# Patient Record
Sex: MALE | Race: WHITE | NOT HISPANIC OR LATINO | Employment: OTHER | URBAN - METROPOLITAN AREA
[De-identification: names, ages, dates, MRNs, and addresses within clinical notes are randomized per-mention and may not be internally consistent; named-entity substitution may affect disease eponyms.]

---

## 2017-05-30 ENCOUNTER — DOCUMENTATION ONLY (OUTPATIENT)
Dept: LAB | Facility: CLINIC | Age: 82
End: 2017-05-30

## 2017-05-30 DIAGNOSIS — C61 MALIGNANT NEOPLASM OF PROSTATE (H): Primary | ICD-10-CM

## 2017-05-30 NOTE — PROGRESS NOTES
Patient has lab only appt 6/8/17. Needs prostate check. Please place appropriate orders.  Thank you,  Kristina higgins

## 2017-05-30 NOTE — LETTER
May 31, 2017      Fernando Hurt  [address] [facility] [address]              Dear Fernando Hurt,    You are due for your regular follow up with Dr. Reeves however he is no longer with [facility]. Please call the scheduling line to arrange an appointment with his partner Dr. Perrin, [phone]. Please schedule a lab appointment one week prior for your PSA test.     Sincerely,       [facility] Urology Team

## 2017-06-08 DIAGNOSIS — C61 MALIGNANT NEOPLASM OF PROSTATE (H): ICD-10-CM

## 2017-06-08 LAB — PSA SERPL-MCNC: 14.6 UG/L (ref 0–4)

## 2017-06-08 PROCEDURE — 84153 ASSAY OF PSA TOTAL: CPT | Performed by: UROLOGY

## 2017-06-08 PROCEDURE — 36415 COLL VENOUS BLD VENIPUNCTURE: CPT | Performed by: UROLOGY

## 2017-06-16 ENCOUNTER — OFFICE VISIT (OUTPATIENT)
Dept: UROLOGY | Facility: CLINIC | Age: 82
End: 2017-06-16
Payer: MEDICARE

## 2017-06-16 VITALS — HEART RATE: 62 BPM | RESPIRATION RATE: 10 BRPM | DIASTOLIC BLOOD PRESSURE: 68 MMHG | SYSTOLIC BLOOD PRESSURE: 102 MMHG

## 2017-06-16 DIAGNOSIS — C61 MALIGNANT NEOPLASM OF PROSTATE (H): Primary | ICD-10-CM

## 2017-06-16 PROCEDURE — 99213 OFFICE O/P EST LOW 20 MIN: CPT | Performed by: UROLOGY

## 2017-06-16 NOTE — LETTER
[address] [address]  [phone]        June 25, 2018    Fernando Hurt  [address] [facility] [address]              Dear Fernando Hurt    This is to remind you that your PSA is due.    You may call our office at [phone] to schedule an appointment.    Please disregard this notice if you have already had your labs drawn or made an appointment.        Sincerely,        Tavera MD/suzanne

## 2017-06-16 NOTE — MR AVS SNAPSHOT
"              After Visit Summary   6/16/2017    Fernando Hurt    MRN: [id number]           Patient Information     Date Of Birth          5/14/1931        Visit Information        Provider Department      6/16/2017 12:00 PM Yeison Perrin MD [facility]        Today's Diagnoses     Malignant Neoplasm of Prostate, clinically T1c    -  1       Follow-ups after your visit        Future tests that were ordered for you today     Open Future Orders        Priority Expected Expires Ordered    PSA tumor marker Routine 12/17/2017 6/17/2018 6/16/2017            Who to contact     If you have questions or need follow up information about today's clinic visit or your schedule please contact [facility] directly at [phone].  Normal or non-critical lab and imaging results will be communicated to you by MyChart, letter or phone within 4 business days after the clinic has received the results. If you do not hear from us within 7 days, please contact the clinic through MyChart or phone. If you have a critical or abnormal lab result, we will notify you by phone as soon as possible.  Submit refill requests through [employer] or call your pharmacy and they will forward the refill request to us. Please allow 3 business days for your refill to be completed.          Additional Information About Your Visit        MyChart Information     [employer] lets you send messages to your doctor, view your test results, renew your prescriptions, schedule appointments and more. To sign up, go to [email or web address] . Click on \"Log in\" on the left side of the screen, which will take you to the Welcome page. Then click on \"Sign up Now\" on the right side of the page.     You will be asked to enter the access code listed below, as well as some personal information. Please follow the directions to create your username and password.     Your access code is: [email or web address]  Expires: 9/14/2017 12:01 PM     Your access " code will  in 90 days. If you need help or a new code, please call your [facility] clinic or [phone].        Care EveryWhere ID     This is your Care EveryWhere ID. This could be used by other organizations to access your [facility] medical records  [id number]        Your Vitals Were     Pulse Respirations                62 10           Blood Pressure from Last 3 Encounters:   17 102/68   09/15/16 128/62   16 130/68    Weight from Last 3 Encounters:   13 77.7 kg (171 lb 3.2 oz)   13 76.7 kg (169 lb)   13 77 kg (169 lb 12.8 oz)               Primary Care Provider Office Phone # Fax #    Rip Temple [phone]       Merged with [facility] [address] [place] [address]        Thank you!     Thank you for choosing [facility]  for your care. Our goal is always to provide you with excellent care. Hearing back from our patients is one way we can continue to improve our services. Please take a few minutes to complete the written survey that you may receive in the mail after your visit with us. Thank you!             Your Updated Medication List - Protect others around you: Learn how to safely use, store and throw away your medicines at www.disposemymeds.org.          This list is accurate as of: 17 12:01 PM.  Always use your most recent med list.                   Brand Name Dispense Instructions for use    aspirin 325 MG tablet      Take  by mouth daily.       LIPITOR 40 MG tablet   Generic drug:  atorvastatin      Take 40 mg by mouth daily.       NATURAL PSYLLIUM SEED PO      Take  by mouth 3 times daily. 1 tsp       NITROSTAT 0.4 MG sublingual tablet   Generic drug:  nitroglycerin      Place  under the tongue every 5 minutes as needed.       PRESERVISION/LUTEIN PO      Take  by mouth.       TOPROL  MG 24 hr tablet   Generic drug:  metoprolol      Take 100 mg by mouth daily.

## 2017-06-16 NOTE — PROGRESS NOTES
Chief Complaint   Patient presents with     PSA Recheck       Fernando Hurt is a 86 year old male who presents today for follow up of   Chief Complaint   Patient presents with     PSA Recheck      Fernando Hurt is an 86-year-old white male who came in for semiannual followup of prostate cancer.  His prostate cancer, Larry score 6, low volume was diagnosed in the year 2007.  He has been managed with active surveillance.  Clinically, he has been entirely asymptomatic.  He has rarely nocturia.  Stream usually is okay, but a little slow in the morning.  Urination is not bothersome.  He is very active, in general, he feels well.  His PSA has slowly risen.  Recent psa is 14's.  Current Outpatient Prescriptions   Medication Sig Dispense Refill     aspirin 325 MG tablet Take  by mouth daily.       atorvastatin (LIPITOR) 40 MG tablet Take 40 mg by mouth daily.       metoprolol (TOPROL XL) 100 MG 24 hr tablet Take 100 mg by mouth daily.       nitroglycerin (NITROSTAT) 0.4 MG SL tablet Place  under the tongue every 5 minutes as needed.       NATURAL PSYLLIUM SEED PO Take  by mouth 3 times daily. 1 tsp       Multiple Vitamins-Minerals (PRESERVISION/LUTEIN PO) Take  by mouth.       No Known Allergies   Past Medical History:   Diagnosis Date     Actinic keratoses      Actinic keratosis      CAD (coronary artery disease) 2013    3 vessel bypass 4/13     Glaucoma      Hyperlipidemia LDL goal < 70      Renal cyst     Rt. large     Renal stones      Skin cancer 2/16/2009    RIGHT SHOULDER,KERATOACANTHOMA     Squamous cell carcinoma (H) 02/10    Rt  forearm     Unstable angina (H) 2013    [facility] consult 4/2013  Dr. Ojeda     Past Surgical History:   Procedure Laterality Date     ARTHROSCOPY KNEE RT/LT      Lt.     C NONSPECIFIC PROCEDURE      finger fracture     CORONARY ARTERY BYPASS  4/19/2013    3 vessel-Dr. Bryan Cannon     HERNIA REPAIR, INGUINAL RT/LT      bilateral, repeat Lt. side     Family History   Problem  Relation Age of Onset     CANCER Mother      Social History     Social History     Marital status:      Spouse name: N/A     Number of children: N/A     Years of education: N/A     Occupational History      Retired     Social History Main Topics     Smoking status: Never Smoker     Smokeless tobacco: Never Used     Alcohol use Yes      Comment: occasional     Drug use: No     Sexual activity: Not Currently     Other Topics Concern     None     Social History Narrative       REVIEW OF SYSTEMS  =================  C: NEGATIVE for fever, chills, change in weight  I: NEGATIVE for worrisome rashes, moles or lesions  E/M: NEGATIVE for ear, mouth and throat problems  R: NEGATIVE for significant cough or SHORTNESS OF BREATH,   CV: NEGATIVE for chest pain, palpitations or peripheral edema  GI: NEGATIVE for nausea, abdominal pain, heartburn, or change in bowel habits  NEURO: NEGATIVE any motor/sensory changes  PSYCH: NEGATIVE for recent mood disorder    Physical Exam:  /68 (BP Location: Right arm, Patient Position: Chair, Cuff Size: Adult Regular)  Pulse 62  Resp 10   Patient is pleasant, in no acute distress, good general condition.  Lung: no evidence of respiratory distress    Abdomen: Soft, nondistended, non tender. No masses. No rebound or guarding.   Exam: no cva tenderness  Skin: Warm and dry.  No redness.  Psych: normal mood and affect  Neuro: alert and oriented    Assessment/Plan:   (C61) Malignant Neoplasm of Prostate, clinically T1c  (primary encounter diagnosis)  Comment: slow progression.  Cont observation.  Plan: PSA tumor marker        In six months

## 2017-06-16 NOTE — NURSING NOTE
"Chief Complaint   Patient presents with     PSA Recheck       Initial /68 (BP Location: Right arm, Patient Position: Chair, Cuff Size: Adult Regular)  Pulse 62  Resp 10 Estimated body mass index is 24.92 kg/(m^2) as calculated from the following:    Height as of 4/9/13: 1.765 m (5' 9.5\").    Weight as of 4/29/13: 77.7 kg (171 lb 3.2 oz).  Medication Reconciliation: complete   Emilie Miramontes RN       "

## 2018-07-26 DIAGNOSIS — C61 MALIGNANT NEOPLASM OF PROSTATE (H): ICD-10-CM

## 2018-07-26 LAB — PSA SERPL-MCNC: 16.7 UG/L (ref 0–4)

## 2018-07-26 PROCEDURE — 36415 COLL VENOUS BLD VENIPUNCTURE: CPT | Performed by: UROLOGY

## 2018-07-26 PROCEDURE — 84153 ASSAY OF PSA TOTAL: CPT | Performed by: UROLOGY

## 2018-08-10 ENCOUNTER — OFFICE VISIT (OUTPATIENT)
Dept: UROLOGY | Facility: CLINIC | Age: 83
End: 2018-08-10
Payer: MEDICARE

## 2018-08-10 VITALS — SYSTOLIC BLOOD PRESSURE: 170 MMHG | HEART RATE: 67 BPM | DIASTOLIC BLOOD PRESSURE: 71 MMHG | OXYGEN SATURATION: 97 %

## 2018-08-10 DIAGNOSIS — R03.0 ELEVATED BLOOD PRESSURE READING WITHOUT DIAGNOSIS OF HYPERTENSION: ICD-10-CM

## 2018-08-10 DIAGNOSIS — C61 MALIGNANT NEOPLASM OF PROSTATE (H): Primary | ICD-10-CM

## 2018-08-10 PROCEDURE — 99213 OFFICE O/P EST LOW 20 MIN: CPT | Performed by: UROLOGY

## 2018-08-10 NOTE — MR AVS SNAPSHOT
After Visit Summary   8/10/2018    Fernando Hurt    MRN: [id number]           Patient Information     Date Of Birth          5/14/1931        Visit Information        Provider Department      8/10/2018 8:45 AM Yeison Perrin MD [facility]        Today's Diagnoses     Malignant Neoplasm of Prostate, clinically T1c    -  1    Elevated blood pressure reading without diagnosis of hypertension           Follow-ups after your visit        Future tests that were ordered for you today     Open Future Orders        Priority Expected Expires Ordered    PSA tumor marker Routine 8/9/2019 8/11/2019 8/10/2018            Who to contact     If you have questions or need follow up information about today's clinic visit or your schedule please contact [facility] directly at [phone].  Normal or non-critical lab and imaging results will be communicated to you by MyChart, letter or phone within 4 business days after the clinic has received the results. If you do not hear from us within 7 days, please contact the clinic through MyChart or phone. If you have a critical or abnormal lab result, we will notify you by phone as soon as possible.  Submit refill requests through [employer] or call your pharmacy and they will forward the refill request to us. Please allow 3 business days for your refill to be completed.          Additional Information About Your Visit        Care EveryWhere ID     This is your Care EveryWhere ID. This could be used by other organizations to access your [facility] medical records  [id number]        Your Vitals Were     Pulse Pulse Oximetry                67 97%           Blood Pressure from Last 3 Encounters:   08/10/18 170/71   06/16/17 102/68   09/15/16 128/62    Weight from Last 3 Encounters:   04/29/13 77.7 kg (171 lb 3.2 oz)   04/09/13 76.7 kg (169 lb)   02/26/13 77 kg (169 lb 12.8 oz)               Primary Care Provider Office Phone # Fax #    Rip ELLIOTT  MD Windy [phone]       [facility] [address] [place] [address]        Equal Access to Services     FELA ANTONY : Hadii aad ku hadmaryni Xiomara, wafabida luqadaha, qaybta kaalmada maria luisa, katharina prajapati humameeta melton laHoangflor zamora. So [facility] [phone].    ATENCIÓN: Si habla español, tiene a choi disposición servicios gratuitos de asistencia lingüística. Llame al [phone].    We comply with applicable federal civil rights laws and [place] laws. We do not discriminate on the basis of race, color, national origin, age, disability, sex, sexual orientation, or gender identity.            Thank you!     Thank you for choosing [facility]  for your care. Our goal is always to provide you with excellent care. Hearing back from our patients is one way we can continue to improve our services. Please take a few minutes to complete the written survey that you may receive in the mail after your visit with us. Thank you!             Your Updated Medication List - Protect others around you: Learn how to safely use, store and throw away your medicines at www.disposemymeds.org.          This list is accurate as of 8/10/18  8:45 AM.  Always use your most recent med list.                   Brand Name Dispense Instructions for use Diagnosis    aspirin 325 MG tablet      Take  by mouth daily.        LIPITOR 40 MG tablet   Generic drug:  atorvastatin      Take 40 mg by mouth daily.        NATURAL PSYLLIUM SEED PO      Take  by mouth 3 times daily. 1 tsp        NITROSTAT 0.4 MG sublingual tablet   Generic drug:  nitroGLYcerin      Place  under the tongue every 5 minutes as needed.        PRESERVISION/LUTEIN PO      Take  by mouth.        TOPROL  MG 24 hr tablet   Generic drug:  metoprolol succinate      Take 100 mg by mouth daily.

## 2018-08-10 NOTE — PROGRESS NOTES
Chief Complaint   Patient presents with     RECHECK     psa       Fernando Hurt is a 87 year old male who presents today for follow up of   Chief Complaint   Patient presents with     RECHECK     psa      Fernando Hurt is an 86-year-old white male who came in for semiannual followup of prostate cancer.  His prostate cancer, Larry score 6, low volume was diagnosed in the year 2007.  He has been managed with active surveillance.  Clinically, he has been entirely asymptomatic.  He has rarely nocturia.  Stream usually is okay, but a little slow in the morning.  Urination is not bothersome.  He is very active, in general, he feels well.  His PSA has slowly risen.  Recent psa is 16's.  Current Outpatient Prescriptions   Medication Sig Dispense Refill     aspirin 325 MG tablet Take  by mouth daily.       atorvastatin (LIPITOR) 40 MG tablet Take 40 mg by mouth daily.       metoprolol (TOPROL XL) 100 MG 24 hr tablet Take 100 mg by mouth daily.       Multiple Vitamins-Minerals (PRESERVISION/LUTEIN PO) Take  by mouth.       NATURAL PSYLLIUM SEED PO Take  by mouth 3 times daily. 1 tsp       nitroglycerin (NITROSTAT) 0.4 MG SL tablet Place  under the tongue every 5 minutes as needed.       No Known Allergies   Past Medical History:   Diagnosis Date     Actinic keratoses      Actinic keratosis      CAD (coronary artery disease) 2013    3 vessel bypass 4/13     Glaucoma      Hyperlipidemia LDL goal < 70      Renal cyst     Rt. large     Renal stones      Skin cancer 2/16/2009    RIGHT SHOULDER,KERATOACANTHOMA     Squamous cell carcinoma 02/10    Rt  forearm     Unstable angina (H) 2013    [facility] consult 4/2013  Dr. Ojeda     Past Surgical History:   Procedure Laterality Date     ARTHROSCOPY KNEE RT/LT      Lt.     C NONSPECIFIC PROCEDURE      finger fracture     CORONARY ARTERY BYPASS  4/19/2013    3 vessel-Dr. Bryan Cannon     HERNIA REPAIR, INGUINAL RT/LT      bilateral, repeat Lt. side     Family History    Problem Relation Age of Onset     Cancer Mother      Social History     Social History     Marital status:      Spouse name: N/A     Number of children: N/A     Years of education: N/A     Occupational History      Retired     Social History Main Topics     Smoking status: Never Smoker     Smokeless tobacco: Never Used     Alcohol use Yes      Comment: occasional     Drug use: No     Sexual activity: Not Currently     Other Topics Concern     None     Social History Narrative       REVIEW OF SYSTEMS  =================  C: NEGATIVE for fever, chills, change in weight  I: NEGATIVE for worrisome rashes, moles or lesions  E/M: NEGATIVE for ear, mouth and throat problems  R: NEGATIVE for significant cough or SHORTNESS OF BREATH,   CV: NEGATIVE for chest pain, palpitations or peripheral edema  GI: NEGATIVE for nausea, abdominal pain, heartburn, or change in bowel habits  NEURO: NEGATIVE any motor/sensory changes  PSYCH: NEGATIVE for recent mood disorder    Physical Exam:  /71 (BP Location: Right arm, Patient Position: Chair, Cuff Size: Adult Regular)  Pulse 67  SpO2 97%   Patient is pleasant, in no acute distress, good general condition.  Lung: no evidence of respiratory distress    Abdomen: Soft, nondistended, non tender. No masses. No rebound or guarding.   Exam: no cva tenderness.  Prostate 40 gm smooth no nodule.  No penile discharge.  No scrotal skin lesion.   Skin: Warm and dry.  No redness.  Psych: normal mood and affect  Neuro: alert and oriented    Assessment/Plan:   (C61) Malignant Neoplasm of Prostate, clinically T1c  (primary encounter diagnosis)  Comment: slow progression.  Cont observation.  Plan: PSA tumor marker        In six months      HTN: Patient to follow up with Primary Care provider regarding elevated blood pressure.

## 2019-09-23 ENCOUNTER — DOCUMENTATION ONLY (OUTPATIENT)
Dept: UROLOGY | Facility: CLINIC | Age: 84
End: 2019-09-23

## 2019-09-23 NOTE — PROGRESS NOTES
This patient has overdue labs. A letter was sent on 8/15/2019 and there has been no lab appointment made. If you still want these labs done, please have your care team contact the patient to make a lab appointment. Otherwise, please have the labs discontinued and close the encounter.    Thank you,  [facility]

## 2019-10-29 DIAGNOSIS — C61 MALIGNANT NEOPLASM OF PROSTATE (H): ICD-10-CM

## 2019-10-29 LAB — PSA SERPL-MCNC: 16 UG/L (ref 0–4)

## 2019-10-29 PROCEDURE — 84153 ASSAY OF PSA TOTAL: CPT | Performed by: UROLOGY

## 2019-10-29 PROCEDURE — 36415 COLL VENOUS BLD VENIPUNCTURE: CPT | Performed by: UROLOGY

## 2019-11-08 ENCOUNTER — OFFICE VISIT (OUTPATIENT)
Dept: UROLOGY | Facility: CLINIC | Age: 84
End: 2019-11-08
Payer: MEDICARE

## 2019-11-08 VITALS — OXYGEN SATURATION: 99 % | HEART RATE: 57 BPM | SYSTOLIC BLOOD PRESSURE: 161 MMHG | DIASTOLIC BLOOD PRESSURE: 75 MMHG

## 2019-11-08 DIAGNOSIS — C61 MALIGNANT NEOPLASM OF PROSTATE (H): Primary | ICD-10-CM

## 2019-11-08 DIAGNOSIS — R03.0 ELEVATED BLOOD PRESSURE READING WITHOUT DIAGNOSIS OF HYPERTENSION: ICD-10-CM

## 2019-11-08 PROCEDURE — 99213 OFFICE O/P EST LOW 20 MIN: CPT | Performed by: UROLOGY

## 2019-11-08 NOTE — PROGRESS NOTES
Chief Complaint   Patient presents with     SAURABH Hurt is a 88 year old male who presents today for follow up of   Chief Complaint   Patient presents with     SAURABH Hurt is an 88-year-old white male who came in for semiannual followup of prostate cancer.  His prostate cancer, Larry score 6, low volume was diagnosed in the year 2007.  He has been managed with active surveillance.  Clinically, he has been entirely asymptomatic.  He has rarely nocturia.  Stream usually is okay, but a little slow in the morning.  Urination is not bothersome.  He is very active, in general, he feels well.  His PSA has slowly risen.  Recent psa is 16's.  Current Outpatient Medications   Medication Sig Dispense Refill     atorvastatin (LIPITOR) 40 MG tablet Take 40 mg by mouth daily.       metoprolol (TOPROL XL) 100 MG 24 hr tablet Take 100 mg by mouth daily.       Multiple Vitamins-Minerals (PRESERVISION/LUTEIN PO) Take  by mouth.       NATURAL PSYLLIUM SEED PO Take  by mouth 3 times daily. 1 tsp       nitroglycerin (NITROSTAT) 0.4 MG SL tablet Place  under the tongue every 5 minutes as needed.       aspirin 325 MG tablet Take  by mouth daily.       No Known Allergies   Past Medical History:   Diagnosis Date     Actinic keratoses      Actinic keratosis      CAD (coronary artery disease) 2013    3 vessel bypass 4/13     Glaucoma      Hyperlipidemia LDL goal < 70      Renal cyst     Rt. large     Renal stones      Skin cancer 2/16/2009    RIGHT SHOULDER,KERATOACANTHOMA     Squamous cell carcinoma 02/10    Rt  forearm     Unstable angina (H) 2013    [facility] 4/2013  Dr. Ojeda     Past Surgical History:   Procedure Laterality Date     ARTHROSCOPY KNEE RT/LT      Lt.     C NONSPECIFIC PROCEDURE      finger fracture     CORONARY ARTERY BYPASS  4/19/2013    3 vessel-Dr. Bryan Cannon     HERNIA REPAIR, INGUINAL RT/LT      bilateral, repeat Lt. side     Family History   Problem Relation Age of  Onset     Cancer Mother      Social History     Social History     Marital status:      Spouse name: N/A     Number of children: N/A     Years of education: N/A     Occupational History      Retired     Social History Main Topics     Smoking status: Never Smoker     Smokeless tobacco: Never Used     Alcohol use Yes      Comment: occasional     Drug use: No     Sexual activity: Not Currently     Other Topics Concern     None     Social History Narrative       REVIEW OF SYSTEMS  =================  C: NEGATIVE for fever, chills, change in weight  I: NEGATIVE for worrisome rashes, moles or lesions  E/M: NEGATIVE for ear, mouth and throat problems  R: NEGATIVE for significant cough or SHORTNESS OF BREATH,   CV: NEGATIVE for chest pain, palpitations or peripheral edema  GI: NEGATIVE for nausea, abdominal pain, heartburn, or change in bowel habits  NEURO: NEGATIVE any motor/sensory changes  PSYCH: NEGATIVE for recent mood disorder    Physical Exam:  BP (!) 161/75 (BP Location: Right arm, Patient Position: Chair, Cuff Size: Adult Large)   Pulse 57   SpO2 99%    Patient is pleasant, in no acute distress, good general condition.  Lung: no evidence of respiratory distress    Abdomen: Soft, nondistended, non tender. No masses. No rebound or guarding.   Exam: no cva tenderness.  Prostate 40 gm smooth no nodule.  No penile discharge.  No scrotal skin lesion.   Skin: Warm and dry.  No redness.  Psych: normal mood and affect  Neuro: alert and oriented    Assessment/Plan:   (C61) Malignant Neoplasm of Prostate, clinically T1c  (primary encounter diagnosis)  Comment: slow progression.  Cont observation.  Plan: PSA tumor marker        In one year    HTN: Patient to follow up with Primary Care provider regarding elevated blood pressure.

## 2021-10-20 ENCOUNTER — TELEPHONE (OUTPATIENT)
Dept: UROLOGY | Facility: CLINIC | Age: 86
End: 2021-10-20

## 2021-10-20 NOTE — TELEPHONE ENCOUNTER
"Daughter Haily Alcaraz left office VM, calling on behalf of her father whom Dr. Perrin last saw in 2019. Over the last week \"He was told to call if he has problems, well, he is having stomach pain during urination.\" Pt has not had any urological follow through since 2019, routing to Dr. Perrin for advisement.  "

## 2021-10-21 ENCOUNTER — LAB (OUTPATIENT)
Dept: LAB | Facility: CLINIC | Age: 86
End: 2021-10-21
Payer: MEDICARE

## 2021-10-21 DIAGNOSIS — R30.0 DYSURIA: ICD-10-CM

## 2021-10-21 DIAGNOSIS — R30.0 DYSURIA: Primary | ICD-10-CM

## 2021-10-21 LAB
ALBUMIN UR-MCNC: ABNORMAL MG/DL
APPEARANCE UR: ABNORMAL
BACTERIA #/AREA URNS HPF: ABNORMAL /HPF
BILIRUB UR QL STRIP: NEGATIVE
COLOR UR AUTO: YELLOW
GLUCOSE UR STRIP-MCNC: NEGATIVE MG/DL
HGB UR QL STRIP: ABNORMAL
KETONES UR STRIP-MCNC: NEGATIVE MG/DL
LEUKOCYTE ESTERASE UR QL STRIP: ABNORMAL
NITRATE UR QL: POSITIVE
PH UR STRIP: 5.5 [PH] (ref 5–7)
RBC #/AREA URNS AUTO: ABNORMAL /HPF
SP GR UR STRIP: >=1.03 (ref 1–1.03)
UROBILINOGEN UR STRIP-ACNC: 0.2 E.U./DL
WBC #/AREA URNS AUTO: >100 /HPF

## 2021-10-21 PROCEDURE — 87086 URINE CULTURE/COLONY COUNT: CPT

## 2021-10-21 PROCEDURE — 81001 URINALYSIS AUTO W/SCOPE: CPT

## 2021-10-21 PROCEDURE — 87186 SC STD MICRODIL/AGAR DIL: CPT

## 2021-10-22 ENCOUNTER — VIRTUAL VISIT (OUTPATIENT)
Dept: UROLOGY | Facility: CLINIC | Age: 86
End: 2021-10-22
Payer: MEDICARE

## 2021-10-22 DIAGNOSIS — R30.0 DYSURIA: Primary | ICD-10-CM

## 2021-10-22 DIAGNOSIS — C61 MALIGNANT NEOPLASM OF PROSTATE (H): ICD-10-CM

## 2021-10-22 PROCEDURE — 99441 PR PHYSICIAN TELEPHONE EVALUATION 5-10 MIN: CPT | Performed by: UROLOGY

## 2021-10-22 RX ORDER — TRAVOPROST OPHTHALMIC SOLUTION 0.04 MG/ML
1 SOLUTION OPHTHALMIC
COMMUNITY

## 2021-10-22 RX ORDER — CIPROFLOXACIN 500 MG/1
500 TABLET, FILM COATED ORAL 2 TIMES DAILY
Qty: 14 TABLET | Refills: 0 | Status: SHIPPED | OUTPATIENT
Start: 2021-10-22

## 2021-10-22 RX ORDER — AMLODIPINE BESYLATE 5 MG/1
TABLET ORAL
COMMUNITY
Start: 2021-10-04

## 2021-10-22 NOTE — PROGRESS NOTES
Fernando is a 89 year old who is being evaluated via a billable telephone visit.      What phone number would you like to be contacted at?   How [facility]    Assessment & Plan   Problem List Items Addressed This Visit     Malignant Neoplasm of Prostate, clinically T1c    Relevant Orders    PSA tumor marker      Other Visit Diagnoses     Dysuria    -  Primary    Relevant Medications    ciprofloxacin (CIPRO) 500 MG tablet           Review of the result(s) of each unique test - ua  Ordering of each unique test  Prescription drug management  20 minutes spent on the date of the encounter doing chart review, history and exam, documentation and further activities per the note       See Patient Instructions    No follow-ups on file.    Yeison Perrin MD  [facility]    Subjective   Fernando is a 89 year old who presents for the following health issues dysuria    HPI     Patient is a pleasant 89-year-old male with history of prostate cancer who has not been seen for a couple of years.  He had slow-growing prostat cancer however.  Recently patient has noticed some dysuria without any fever, nausea, vomiting, back pain, gross hematuria.    Review of Systems   Constitutional, HEENT, cardiovascular, pulmonary, gi and gu systems are negative, except as otherwise noted.      Objective           Vitals:  No vitals were obtained today due to virtual visit.    Physical Exam   healthy, alert and no distress  PSYCH: Alert and oriented times 3; coherent speech, normal   rate and volume, able to articulate logical thoughts, able   to abstract reason, no tangential thoughts, no hallucinations   or delusions  His affect is normal  RESP: No cough, no audible wheezing, able to talk in full sentences  Remainder of exam unable to be completed due to telephone visits    #1 dysuria: UA with many WBC per high-power field.  Will start patient on Cipro 500 mg p.o. twice daily for 7 days.   Urine culture pending.    #2 history of prostate cancer: PSA ordered.            Phone call duration: 10 minutes

## 2021-10-24 LAB — BACTERIA UR CULT: ABNORMAL

## 2021-10-25 ENCOUNTER — LAB (OUTPATIENT)
Dept: LAB | Facility: CLINIC | Age: 86
End: 2021-10-25
Payer: MEDICARE

## 2021-10-25 DIAGNOSIS — C61 MALIGNANT NEOPLASM OF PROSTATE (H): ICD-10-CM

## 2021-10-25 LAB — PSA SERPL-MCNC: 23.5 UG/L (ref 0–4)

## 2021-10-25 PROCEDURE — 36415 COLL VENOUS BLD VENIPUNCTURE: CPT

## 2021-10-25 PROCEDURE — 84153 ASSAY OF PSA TOTAL: CPT

## 2021-11-29 ENCOUNTER — TELEPHONE (OUTPATIENT)
Dept: FAMILY MEDICINE | Facility: CLINIC | Age: 86
End: 2021-11-29
Payer: COMMERCIAL

## 2021-11-29 ENCOUNTER — TELEPHONE (OUTPATIENT)
Dept: UROLOGY | Facility: CLINIC | Age: 86
End: 2021-11-29
Payer: COMMERCIAL

## 2021-11-29 DIAGNOSIS — R30.0 DYSURIA: Primary | ICD-10-CM

## 2021-11-29 NOTE — TELEPHONE ENCOUNTER
Routing to Dr. Perrin high priority per daughter's request, as patient is very uncomfortable.  She is wondering if you would prescribe an antibiotic for suspected UTI or if patient should drop of urine sample.     Patient has had UTI symptoms x 3-4 days which include: frequency, urgency, and burning while urinating.  Denies fever and low back pain.  She is unsure if he has hesitancy or foul odor.    Daughter reports patient was treated for a UTI 10/21 and was prescribed an antibiotic.  Symptoms had completely resolved prior to this episode.    Then RN to call Liza back at [phone].    Giancarlo Honeycutt RN

## 2021-11-29 NOTE — TELEPHONE ENCOUNTER
Routing to Dr. Perrin to obtain order for another UA prior to treatment with ABX.    Awaiting MD approval.    Karla HAGAN RN Urology 11/29/2021 3:42 PM

## 2021-11-29 NOTE — TELEPHONE ENCOUNTER
..Reason for Call:   Prescription for uti    Detailed comments: Patient had a UTI/had labs on 10-21, it is back, and wondering if can have another course of the medication prescribed at that time as it did work;  new sx began about 3-4 days ago;   [facility] [phone]    Phone Number Patient can be reached at:  Liza/daughter phone number:  [phone]  *is with Patient.    Best Time: anytime    Can we leave a detailed message on this number? YES    Call taken on 11/29/2021 at 10:14 AM by Malou Griffin

## 2021-11-30 ENCOUNTER — TELEPHONE (OUTPATIENT)
Dept: UROLOGY | Facility: CLINIC | Age: 86
End: 2021-11-30
Payer: COMMERCIAL

## 2021-11-30 RX ORDER — CIPROFLOXACIN 500 MG/1
500 TABLET, FILM COATED ORAL 2 TIMES DAILY
Qty: 14 TABLET | Refills: 0 | Status: SHIPPED | OUTPATIENT
Start: 2021-11-30

## 2021-11-30 NOTE — TELEPHONE ENCOUNTER
Called daughter and let her know abx were sent. If s/s return after finishing needs to c/b for UA/uc order.    Vanessa CROCKETT RN Specialty Triage 11/30/2021 11:57 AM

## 2021-11-30 NOTE — TELEPHONE ENCOUNTER
Reason for Call:  Other call back    Detailed comments: Daughter Liza is calling regarding previous message and would like a call back asap for instructions on going to ER department.    Phone Number Patient can be reached at: Other phone number:  [phone]    Best Time: soon    Can we leave a detailed message on this number? YES    Call taken on 11/30/2021 at 11:54 AM by Lesvia Conteh

## 2021-11-30 NOTE — TELEPHONE ENCOUNTER
Reason for Call:  Other call back    Detailed comments: Daughter Liza is calling back for status on message left yesterday, please call with status. Please see encounter dated 11/29/21. Thank you.    Phone Number Patient can be reached at: [phone]    Best Time:     Can we leave a detailed message on this number? NO    Call taken on 11/30/2021 at 10:50 AM by Alexandra Ferraro

## 2024-09-25 ENCOUNTER — TELEPHONE (OUTPATIENT)
Dept: UROLOGY | Facility: CLINIC | Age: 89
End: 2024-09-25
Payer: COMMERCIAL

## 2024-09-25 NOTE — TELEPHONE ENCOUNTER
[facility] Call Center    Phone Message    May a detailed message be left on voicemail: yes     Reason for Call: Other: Pt's daughter Haily said her dad has been going to the bathroom frequently and pain on his groin area when he urinates. Please call Haily [phone]. Thanks.    Action Taken: Other: [facility]    Travel Screening: Not Applicable     Date of Service:

## 2024-09-26 NOTE — TELEPHONE ENCOUNTER
Returned call and explained that pt needs an appt with dr alvarez, but should be seen for treatment of a possible UTI. Daughter verbalized understanding, and an appt was made for pt.    ROSALIND Edge RN 9/26/2024 2:02 PM

## 2024-10-15 ENCOUNTER — OFFICE VISIT (OUTPATIENT)
Dept: UROLOGY | Facility: CLINIC | Age: 89
End: 2024-10-15
Payer: MEDICARE

## 2024-10-15 ENCOUNTER — PREP FOR PROCEDURE (OUTPATIENT)
Dept: UROLOGY | Facility: CLINIC | Age: 89
End: 2024-10-15

## 2024-10-15 VITALS — OXYGEN SATURATION: 100 % | SYSTOLIC BLOOD PRESSURE: 177 MMHG | DIASTOLIC BLOOD PRESSURE: 71 MMHG | HEART RATE: 73 BPM

## 2024-10-15 DIAGNOSIS — R33.8 BENIGN PROSTATIC HYPERPLASIA WITH URINARY RETENTION: ICD-10-CM

## 2024-10-15 DIAGNOSIS — N40.1 BENIGN PROSTATIC HYPERPLASIA WITH URINARY RETENTION: Primary | ICD-10-CM

## 2024-10-15 DIAGNOSIS — R33.8 BENIGN PROSTATIC HYPERPLASIA WITH URINARY RETENTION: Primary | ICD-10-CM

## 2024-10-15 DIAGNOSIS — N40.1 BENIGN PROSTATIC HYPERPLASIA WITH URINARY RETENTION: ICD-10-CM

## 2024-10-15 DIAGNOSIS — Z87.440 PERSONAL HISTORY OF URINARY TRACT INFECTION: Primary | ICD-10-CM

## 2024-10-15 DIAGNOSIS — C61 MALIGNANT NEOPLASM OF PROSTATE (H): ICD-10-CM

## 2024-10-15 DIAGNOSIS — I10 HYPERTENSION, UNSPECIFIED TYPE: ICD-10-CM

## 2024-10-15 LAB
ALBUMIN UR-MCNC: NEGATIVE MG/DL
APPEARANCE UR: CLEAR
BILIRUB UR QL STRIP: NEGATIVE
COLOR UR AUTO: YELLOW
GLUCOSE UR STRIP-MCNC: NEGATIVE MG/DL
HGB UR QL STRIP: NEGATIVE
KETONES UR STRIP-MCNC: NEGATIVE MG/DL
LEUKOCYTE ESTERASE UR QL STRIP: NEGATIVE
NITRATE UR QL: NEGATIVE
PH UR STRIP: 5.5 [PH] (ref 5–7)
SP GR UR STRIP: 1.01 (ref 1–1.03)
UROBILINOGEN UR STRIP-ACNC: 0.2 E.U./DL

## 2024-10-15 PROCEDURE — 99214 OFFICE O/P EST MOD 30 MIN: CPT | Mod: 25 | Performed by: UROLOGY

## 2024-10-15 PROCEDURE — 52000 CYSTOURETHROSCOPY: CPT | Performed by: UROLOGY

## 2024-10-15 PROCEDURE — 51798 US URINE CAPACITY MEASURE: CPT | Performed by: UROLOGY

## 2024-10-15 PROCEDURE — 81003 URINALYSIS AUTO W/O SCOPE: CPT | Performed by: UROLOGY

## 2024-10-15 NOTE — PROGRESS NOTES
S: Fernando Hurt is a pleasant  89 year old male who was requested to be seen by  Rip Temple for a consult with regard to patient's urinary complaints.  Patient was recently treated for a UTI no culture was treated with Keflex.  Patient complains of Double voiding, Nocturia x 2, Urgency, and Frequency.  He has history of elevated PSA and prostate cancer (active surveillance)   Symptoms have been on going for  several years(s).  Seems to be worsened over time.  His recent PSA was found to be   PSA   Date Value Ref Range Status   10/29/2019 16.00 (H) 0 - 4 ug/L Final     Comment:     Assay Method:  Chemiluminescence using Siemens Vista analyzer     PSA Tumor Marker   Date Value Ref Range Status   10/25/2021 23.50 (H) 0.00 - 4.00 ug/L Final   .  His AUA Symptom Score:  21.  His QOL score:  2.    Current Outpatient Medications   Medication Sig Dispense Refill    amLODIPine (NORVASC) 5 MG tablet       atorvastatin (LIPITOR) 40 MG tablet Take 20 mg by mouth daily       ciprofloxacin (CIPRO) 500 MG tablet Take 1 tablet (500 mg) by mouth 2 times daily 14 tablet 0    ciprofloxacin (CIPRO) 500 MG tablet Take 1 tablet (500 mg) by mouth 2 times daily 14 tablet 0    metoprolol (TOPROL XL) 100 MG 24 hr tablet Take 100 mg by mouth daily.      Multiple Vitamins-Minerals (PRESERVISION/LUTEIN PO) Take  by mouth.      NATURAL PSYLLIUM SEED PO Take  by mouth 3 times daily. 1 tsp      nitroglycerin (NITROSTAT) 0.4 MG SL tablet Place  under the tongue every 5 minutes as needed.      travoprost BAK FREE (TRAVATAN Z) 0.004 % ophthalmic solution Apply 1 drop to eye       No Known Allergies  Past Medical History:   Diagnosis Date    Actinic keratoses     Actinic keratosis     CAD (coronary artery disease) 2013    3 vessel bypass 4/13    Glaucoma     Hyperlipidemia LDL goal < 70     Renal cyst     Rt. large    Renal stones     Skin cancer 2/16/2009    RIGHT SHOULDER,KERATOACANTHOMA    Squamous cell carcinoma 02/10    Rt   forearm    Unstable angina (H) 2013    [facility] consult 4/2013  Dr. Ojeda     Past Surgical History:   Procedure Laterality Date    ARTHROSCOPY KNEE RT/LT      Lt.    CORONARY ARTERY BYPASS  4/19/2013    3 vessel-Dr. Bryan Cannon    HERNIA REPAIR, INGUINAL RT/LT      bilateral, repeat Lt. side    ZZC NONSPECIFIC PROCEDURE      finger fracture      Family History   Problem Relation Age of Onset    Cancer Mother      He does not have a family history of prostate cancer.  Social History     Socioeconomic History    Marital status:      Spouse name: None    Number of children: None    Years of education: None    Highest education level: None   Occupational History     Employer: RETIRED   Tobacco Use    Smoking status: Never    Smokeless tobacco: Never   Substance and Sexual Activity    Alcohol use: Yes     Comment: occasional    Drug use: No    Sexual activity: Not Currently     Social Determinants of Health     Financial Resource Strain: Low Risk  (2/8/2024)    Received from [facility]    Financial Resource Strain     Difficulty of Paying Living Expenses: 3   Food Insecurity: No Food Insecurity (2/8/2024)    [facility] from [facility]    Food Insecurity     Worried About Running Out of Food in the Last Year: 1   Transportation Needs: No Transportation Needs (2/8/2024)    [facility] from [facility]    Transportation Needs     Lack of Transportation (Medical): 1   Social Connections: Socially Integrated (2/8/2024)    Received from [facility]    Social Connections     Frequency of Communication with Friends and Family: 0   Housing Stability: Low Risk  (2/8/2024)    Received from [facility]    Housing Stability     Unable to Pay for Housing in the Last Year: 1        REVIEW OF SYSTEMS  =================  C: NEGATIVE for fever, chills, change in  weight  I: NEGATIVE for worrisome rashes, moles or lesions  E/M: NEGATIVE for ear, mouth and throat problems  R: NEGATIVE for significant cough or SHORTNESS OF BREATH  CV:  NEGATIVE for chest pain, palpitations or peripheral edema  GI: NEGATIVE for nausea, abdominal pain, heartburn, or change in bowel habits  NEURO: NEGATIVE numbness/weakness  : see HPI  PSYCH: NEGATIVE depression/anxiety  LYmph: no new enlarged lymph nodes  Ortho: no new trauma/movements         Results for orders placed or performed in visit on 10/15/24   UA Macroscopic with reflex to Microscopic and Culture     Status: Normal    Specimen: Urine, Clean Catch   Result Value Ref Range    Color Urine Yellow Colorless, Straw, Light Yellow, Yellow    Appearance Urine Clear Clear    Glucose Urine Negative Negative mg/dL    Bilirubin Urine Negative Negative    Ketones Urine Negative Negative mg/dL    Specific Gravity Urine 1.015 1.003 - 1.035    Blood Urine Negative Negative    pH Urine 5.5 5.0 - 7.0    Protein Albumin Urine Negative Negative mg/dL    Urobilinogen Urine 0.2 0.2, 1.0 E.U./dL    Nitrite Urine Negative Negative    Leukocyte Esterase Urine Negative Negative    Narrative    Microscopic not indicated       PVR: 426 ml         Cystoscopy:   The anterior urethra is normal  The prostatic urethra showed trilobar enlargement, with medium lobe protruding into bladder    The length is 3cm,  the coaptation is 3 cm.     In the bladder there is trabeculation grade 2-3.      O: Exam:BP (!) 177/71   Pulse 73   SpO2 100%    Constitutional: healthy, alert and no distress  Cardiovascular: negative, PMI normal.   Respiratory: negative, no evidence of respiratory distress  Gastrointestinal: Abdomen soft, non-tender. BS normal. No masses, organomegaly  :no inguinal hernias, normal scrotum, penis,  testes normal,     Musculoskeletal: extremities normal- no gross deformities noted, gait normal and normal muscle tone  Skin: no suspicious lesions or  rashes  Neurologic: Alert and oriented  Psychiatric: mentation appears normal. and affect normal/bright  Hematologic/Lymphatic/Immunologic: normal ant/post cervical, axillary, supraclavicular and inguinal nodes    Assessment/Plan:   1. Benign prostatic hyperplasia with urinary retention  ASSESSMENT:  After cystoscopy, I took the patient back to another examining room and we had a formal visit.    Regarding management, patient has significant lower urinary tract symptoms.  Therefore, I would recommend XPS laser transurethral resection of the prostate.  I showed him the anatomy illustration and video of the procedure.    More specifically, we talked about the potential complications.  There is a 1% chance of urinary incontinence, retrograde ejaculation, bladder neck contracture, sometimes prolonged frequency and urgency due to tissue dystrophy, perioperative bleeding and sometimes delayed bleeding.  The patient has very good understanding and he wants to proceed with surgery.  I answered all of his questions and will schedule XPS laser transurethral resection of the prostate  in the near future.     - MEASURE POST-VOID RESIDUAL URINE/BLADDER CAPACITY, US NON-IMAGING (28010)  - CYSTOURETHROSCOPY (28807)    2. Personal history of urinary tract infection  UA negative resolved   - UA Macroscopic with reflex to Microscopic and Culture    3. Malignant neoplasm of prostate (H)  PCP continues to check PSA     4. High Blood Pressure   Patient to follow up with PCP     Physician Attestation   I, Yeison Perrin MD, saw this patient and agree with the findings and plan of care as documented in the note.      Items personally reviewed/procedural attestation: I agree with the interpretation documented in the note.    MORENA Sierra CNP

## 2024-10-15 NOTE — PATIENT INSTRUCTIONS
Surgery Preparation    You will receive a phone call from the [facility] within 1-2 days. If you do not receive a call within 2 days, contact [phone] to schedule the procedure. You can write the location/date/time of surgery in the space provided below for your records.    Location of Surgery:                                          Date of Surgery:                                                 Time of Arrival:                                                   Time of Surgery:                                                   Please arrange an appointment with a primary care provider for a pre-op physical. This is a mandatory appointment that needs to be completed within the two weeks prior to your surgery date. This gives clearance for you to receive anesthesia during your procedure. If you have had a pre-op physical within 30 days of your surgery date, you may not need another one. Check with your provider.    If you are having a Same Day Surgery, you must have a  to and from the procedure. Someone needs to stay with you post-operatively for 12 hours.     Do not eat or drink any solids, milk products, or pulpy juices after midnight the night before your surgery. You may have clear liquids up to 8 hours prior to the surgery. This would include water, soda, coffee (without cream), tea, broth, and jello.    Please stop all over the counter blood thinners such as Aspirin, Advil, Aleve, Ibuprofen, Motrin, and Excedrin one week prior to surgery. Please discontinue prescription blood thinners 5-7 days prior to surgery, per your primary physician's orders.     Please check with your insurance company to confirm that your procedure is covered, and that the facility is in-network.     Call the Urology Department @ [phone] if you have questions about your surgery, or if you need to reschedule your procedure.

## 2024-10-15 NOTE — PROGRESS NOTES
"Patient presents to the clinic today for   Encounter Diagnosis   Name Primary?    Personal history of urinary tract infection Yes     Patient relays the following information: \"I had a UTI and went to Urgent care. He couldn't prescribe anything, so we went to urgent care. I am over the real UTI, but I still have a urge to urinate often.  I have prostate cancer. Nocturia x2. \"    UA, PVR, and AUA obtained.    Bladder Scan performed. 426 ml maximum residual urine detected after 3 scans. MD informed.      Pt completed the AUA in clinic today. AUA ([employer]) Symptom score: 35/35.  Quality of life score: 6/6    Karla HAGAN RN   [facility], Urology   10/15/2024 11:08 AM      "

## 2024-10-17 ENCOUNTER — HOSPITAL ENCOUNTER (OUTPATIENT)
Facility: AMBULATORY SURGERY CENTER | Age: 89
End: 2024-10-17
Attending: UROLOGY | Admitting: UROLOGY
Payer: MEDICARE

## 2024-10-17 ENCOUNTER — TELEPHONE (OUTPATIENT)
Dept: UROLOGY | Facility: CLINIC | Age: 89
End: 2024-10-17
Payer: MEDICARE

## 2024-10-17 PROBLEM — N40.1 BENIGN PROSTATIC HYPERPLASIA WITH URINARY RETENTION: Status: ACTIVE | Noted: 2024-10-15

## 2024-10-17 PROBLEM — R33.8 BENIGN PROSTATIC HYPERPLASIA WITH URINARY RETENTION: Status: ACTIVE | Noted: 2024-10-15

## 2024-10-17 NOTE — TELEPHONE ENCOUNTER
Type of surgery: TRANSURETHRAL RESECTION (TUR) PROSTATE, USING KTP LASER (N/A)   Location of surgery: [facility]  Date and time of surgery: 12/5  Surgeon: kim   Pre-Op Appt Date: he will schedule  Post-Op Appt Date: 1/14   Packet sent out: Yes  Pre-cert/Authorization completed:  Not Applicable  Date:

## 2024-11-06 ENCOUNTER — TELEPHONE (OUTPATIENT)
Dept: UROLOGY | Facility: CLINIC | Age: 89
End: 2024-11-06
Payer: MEDICARE

## 2024-11-06 NOTE — CONFIDENTIAL NOTE
No C2C on file for daughter. Dr. Perrin does not require  pre-op forms to be completed.    Karla HAGAN RN   [facility], Urology   11/6/2024 1:06 PM

## 2024-11-06 NOTE — TELEPHONE ENCOUNTER
[facility] Call Center    Phone Message    May a detailed message be left on voicemail: no     Reason for Call: Other: Patients daughter was wondering if the clinic sent out a form for his primary to fill out for his pre-op. Please call Haily back to verify.     Action Taken: Other: [facility]    Travel Screening: Not Applicable

## 2024-11-26 VITALS — WEIGHT: 154.54 LBS

## 2024-12-02 ENCOUNTER — ANESTHESIA EVENT (OUTPATIENT)
Dept: SURGERY | Facility: CLINIC | Age: 89
End: 2024-12-02
Payer: MEDICARE

## 2024-12-02 ENCOUNTER — HOSPITAL ENCOUNTER (OUTPATIENT)
Facility: CLINIC | Age: 89
End: 2024-12-02
Attending: UROLOGY | Admitting: UROLOGY
Payer: MEDICARE

## 2024-12-05 ENCOUNTER — ANESTHESIA (OUTPATIENT)
Dept: SURGERY | Facility: CLINIC | Age: 89
End: 2024-12-05
Payer: MEDICARE

## 2024-12-23 RX ORDER — DORZOLAMIDE HCL 20 MG/ML
1 SOLUTION/ DROPS OPHTHALMIC 2 TIMES DAILY
COMMUNITY
Start: 2024-09-17

## 2024-12-23 RX ORDER — TIMOLOL MALEATE 5 MG/ML
1 SOLUTION/ DROPS OPHTHALMIC DAILY
COMMUNITY
Start: 2024-10-14

## 2025-01-06 ENCOUNTER — HOSPITAL ENCOUNTER (OUTPATIENT)
Facility: CLINIC | Age: OVER 89
Discharge: HOME | End: 2025-01-06
Attending: UROLOGY | Admitting: UROLOGY
Payer: MEDICARE

## 2025-01-06 VITALS
OXYGEN SATURATION: 96 % | DIASTOLIC BLOOD PRESSURE: 69 MMHG | RESPIRATION RATE: 18 BRPM | SYSTOLIC BLOOD PRESSURE: 143 MMHG | WEIGHT: 157 LBS | HEART RATE: 66 BPM | TEMPERATURE: 96.8 F

## 2025-01-06 DIAGNOSIS — N40.1 BENIGN PROSTATIC HYPERPLASIA WITH URINARY RETENTION: Primary | ICD-10-CM

## 2025-01-06 DIAGNOSIS — R33.8 BENIGN PROSTATIC HYPERPLASIA WITH URINARY RETENTION: Primary | ICD-10-CM

## 2025-01-06 PROCEDURE — 710N000012 HC RECOVERY PHASE 2, PER MINUTE: Performed by: UROLOGY

## 2025-01-06 PROCEDURE — 250N000011 HC RX IP 250 OP 636: Performed by: NURSE ANESTHETIST, CERTIFIED REGISTERED

## 2025-01-06 PROCEDURE — 250N000009 HC RX 250: Performed by: NURSE ANESTHETIST, CERTIFIED REGISTERED

## 2025-01-06 PROCEDURE — 370N000017 HC ANESTHESIA TECHNICAL FEE, PER MIN: Performed by: UROLOGY

## 2025-01-06 PROCEDURE — 999N000141 HC STATISTIC PRE-PROCEDURE NURSING ASSESSMENT: Performed by: UROLOGY

## 2025-01-06 PROCEDURE — 360N000077 HC SURGERY LEVEL 4, PER MIN: Performed by: UROLOGY

## 2025-01-06 PROCEDURE — 258N000003 HC RX IP 258 OP 636: Performed by: NURSE ANESTHETIST, CERTIFIED REGISTERED

## 2025-01-06 PROCEDURE — 272N000002 HC OR SUPPLY OTHER OPNP: Performed by: UROLOGY

## 2025-01-06 PROCEDURE — 710N000010 HC RECOVERY PHASE 1, LEVEL 2, PER MIN: Performed by: UROLOGY

## 2025-01-06 PROCEDURE — 272N000001 HC OR GENERAL SUPPLY STERILE: Performed by: UROLOGY

## 2025-01-06 PROCEDURE — 52648 LASER SURGERY OF PROSTATE: CPT | Performed by: UROLOGY

## 2025-01-06 PROCEDURE — 250N000011 HC RX IP 250 OP 636: Performed by: UROLOGY

## 2025-01-06 RX ORDER — LIDOCAINE 40 MG/G
CREAM TOPICAL
Status: DISCONTINUED | OUTPATIENT
Start: 2025-01-06 | End: 2025-01-06 | Stop reason: HOSPADM

## 2025-01-06 RX ORDER — PROPOFOL 10 MG/ML
INJECTION, EMULSION INTRAVENOUS PRN
Status: DISCONTINUED | OUTPATIENT
Start: 2025-01-06 | End: 2025-01-06

## 2025-01-06 RX ORDER — EPHEDRINE SULFATE 50 MG/ML
INJECTION, SOLUTION INTRAMUSCULAR; INTRAVENOUS; SUBCUTANEOUS PRN
Status: DISCONTINUED | OUTPATIENT
Start: 2025-01-06 | End: 2025-01-06

## 2025-01-06 RX ORDER — HYDROCODONE BITARTRATE AND ACETAMINOPHEN 5; 325 MG/1; MG/1
1-2 TABLET ORAL EVERY 4 HOURS PRN
Qty: 10 TABLET | Refills: 0 | Status: SHIPPED | OUTPATIENT
Start: 2025-01-06

## 2025-01-06 RX ORDER — GLYCOPYRROLATE 0.2 MG/ML
INJECTION, SOLUTION INTRAMUSCULAR; INTRAVENOUS PRN
Status: DISCONTINUED | OUTPATIENT
Start: 2025-01-06 | End: 2025-01-06

## 2025-01-06 RX ORDER — CEFAZOLIN SODIUM/WATER 2 G/20 ML
2 SYRINGE (ML) INTRAVENOUS SEE ADMIN INSTRUCTIONS
Status: DISCONTINUED | OUTPATIENT
Start: 2025-01-06 | End: 2025-01-06 | Stop reason: HOSPADM

## 2025-01-06 RX ORDER — CEFAZOLIN SODIUM/WATER 2 G/20 ML
2 SYRINGE (ML) INTRAVENOUS
Status: COMPLETED | OUTPATIENT
Start: 2025-01-06 | End: 2025-01-06

## 2025-01-06 RX ORDER — LIDOCAINE HYDROCHLORIDE 20 MG/ML
INJECTION, SOLUTION INFILTRATION; PERINEURAL PRN
Status: DISCONTINUED | OUTPATIENT
Start: 2025-01-06 | End: 2025-01-06

## 2025-01-06 RX ORDER — SODIUM CHLORIDE, SODIUM LACTATE, POTASSIUM CHLORIDE, CALCIUM CHLORIDE 600; 310; 30; 20 MG/100ML; MG/100ML; MG/100ML; MG/100ML
INJECTION, SOLUTION INTRAVENOUS CONTINUOUS
Status: DISCONTINUED | OUTPATIENT
Start: 2025-01-06 | End: 2025-01-06 | Stop reason: HOSPADM

## 2025-01-06 RX ORDER — CEPHALEXIN 500 MG/1
500 CAPSULE ORAL 3 TIMES DAILY
Qty: 9 CAPSULE | Refills: 0 | Status: SHIPPED | OUTPATIENT
Start: 2025-01-06 | End: 2025-01-09

## 2025-01-06 RX ORDER — FENTANYL CITRATE 50 UG/ML
INJECTION, SOLUTION INTRAMUSCULAR; INTRAVENOUS PRN
Status: DISCONTINUED | OUTPATIENT
Start: 2025-01-06 | End: 2025-01-06

## 2025-01-06 RX ORDER — ONDANSETRON 2 MG/ML
INJECTION INTRAMUSCULAR; INTRAVENOUS PRN
Status: DISCONTINUED | OUTPATIENT
Start: 2025-01-06 | End: 2025-01-06

## 2025-01-06 RX ORDER — PROPOFOL 10 MG/ML
INJECTION, EMULSION INTRAVENOUS CONTINUOUS PRN
Status: DISCONTINUED | OUTPATIENT
Start: 2025-01-06 | End: 2025-01-06

## 2025-01-06 RX ADMIN — Medication 2 G: at 12:33

## 2025-01-06 RX ADMIN — FENTANYL CITRATE 25 MCG: 50 INJECTION INTRAMUSCULAR; INTRAVENOUS at 13:01

## 2025-01-06 RX ADMIN — Medication 10 MG: at 12:40

## 2025-01-06 RX ADMIN — Medication 5 MG: at 12:45

## 2025-01-06 RX ADMIN — LIDOCAINE HYDROCHLORIDE 50 MG: 20 INJECTION, SOLUTION INFILTRATION; PERINEURAL at 12:34

## 2025-01-06 RX ADMIN — ONDANSETRON 4 MG: 2 INJECTION INTRAMUSCULAR; INTRAVENOUS at 12:57

## 2025-01-06 RX ADMIN — GLYCOPYRROLATE 0.2 MG: 0.2 INJECTION, SOLUTION INTRAMUSCULAR; INTRAVENOUS at 12:56

## 2025-01-06 RX ADMIN — FENTANYL CITRATE 50 MCG: 50 INJECTION INTRAMUSCULAR; INTRAVENOUS at 12:34

## 2025-01-06 RX ADMIN — PROPOFOL 30 MG: 10 INJECTION, EMULSION INTRAVENOUS at 13:01

## 2025-01-06 RX ADMIN — SODIUM CHLORIDE, POTASSIUM CHLORIDE, SODIUM LACTATE AND CALCIUM CHLORIDE: 600; 310; 30; 20 INJECTION, SOLUTION INTRAVENOUS at 11:36

## 2025-01-06 RX ADMIN — Medication 5 MG: at 12:57

## 2025-01-06 RX ADMIN — PROPOFOL 200 MCG/KG/MIN: 10 INJECTION, EMULSION INTRAVENOUS at 12:34

## 2025-01-06 RX ADMIN — PROPOFOL 120 MG: 10 INJECTION, EMULSION INTRAVENOUS at 12:34

## 2025-01-06 ASSESSMENT — ACTIVITIES OF DAILY LIVING (ADL)
ADLS_ACUITY_SCORE: 41
ADLS_ACUITY_SCORE: 43
ADLS_ACUITY_SCORE: 41

## 2025-01-06 NOTE — BRIEF OP NOTE
[facility]    Brief Operative Note    Pre-operative diagnosis: Benign prostatic hyperplasia with urinary retention [N40.1, R33.8]  Post-operative diagnosis Same as pre-operative diagnosis    Procedure: TRANSURETHRAL RESECTION (TUR) PROSTATE, USING KTP LASER, N/A - Penis    Surgeon: Surgeons and Role:     * Yeison Perrin MD - Primary  Anesthesia: General   Estimated Blood Loss: Minimal    Drains: None  Specimens: * No specimens in log *  Findings:   None.  Complications: None.  Implants: * No implants in log *

## 2025-01-06 NOTE — ANESTHESIA CARE TRANSFER NOTE
Patient: Fernando Hurt    Procedure: Procedure(s):  TRANSURETHRAL RESECTION (TUR) PROSTATE, USING KTP LASER       Diagnosis: Benign prostatic hyperplasia with urinary retention [N40.1, R33.8]  Diagnosis Additional Information: No value filed.    Anesthesia Type:   General     Note:    Oropharynx: spontaneously breathing  Level of Consciousness: drowsy  Oxygen Supplementation: face mask  Level of Supplemental Oxygen (L/min / FiO2): 8  Independent Airway: airway patency satisfactory and stable  Dentition: dentition unchanged  Vital Signs Stable: post-procedure vital signs reviewed and stable  Report to RN Given: handoff report given  Patient transferred to: Phase II    Handoff Report: Identifed the Patient, Identified the Reponsible Provider, Reviewed the pertinent medical history, Discussed the surgical course, Reviewed Intra-OP anesthesia mangement and issues during anesthesia, Set expectations for post-procedure period and Allowed opportunity for questions and acknowledgement of understanding      Vitals:  Vitals Value Taken Time   /59 01/06/25 1332   Temp 97  F 01/06/25 1332   Pulse 85 01/06/25 1332   Resp 16 01/06/25 1332   SpO2 99 % 01/06/25 1332   Vitals shown include unfiled device data.    Electronically Signed By: MORENA High CRNA  January 6, 2025  2:51 PM

## 2025-01-06 NOTE — OP NOTE
PREOPERATIVE DIAGNOSIS:  Benign prostatic hyperplasia with obstruction      POSTOPERATIVE DIAGNOSIS:  Benign prostatic hyperplasia with obstruction      PROCEDURE DONE:  XPS laser vaporization of prostate.       DESCRIPTION OF PROCEDURE:  He was brought to the operating room and after general anesthesia was induced, he was placed in the dorsal lithotomy position.  He was draped and prepped in the usual sterile surgical fashion.  Preop enema was given.  A cystoscope was then placed into the bladder.  The patient has trilobar prostatic obstruction with the lateral lobes measured about 3 cm in length.  Using the XPS laser fiber, I proceeded to vaporize the prostate from the bladder neck toward the verumontanum.  A total of [id number] joules of energy was used.  At the end of the procedure, the prostatic fossa was wide open.  A 22-Yakut 3-way catheter was then placed.  The patient tolerated the procedure well.  There were no complications identified during the procedure.  There was minimal bleeding during the procedure.           NERI BLEVINS MD

## 2025-01-06 NOTE — ANESTHESIA PREPROCEDURE EVALUATION
Anesthesia Pre-Procedure Evaluation    Patient: Fernando Hurt   MRN: [id number] : 1931        Procedure : Procedure(s):  TRANSURETHRAL RESECTION (TUR) PROSTATE, USING KTP LASER          Past Medical History:   Diagnosis Date     Actinic keratoses      Actinic keratosis      CAD (coronary artery disease)     3 vessel bypass      Glaucoma      Hyperlipidemia LDL goal < 70      Renal cyst     Rt. large     Renal stones      Skin cancer 2009    RIGHT SHOULDER,KERATOACANTHOMA     Squamous cell carcinoma 02/10    Rt  forearm     Unstable angina (H)     [facility] consult 2013  Dr. Ojeda      Past Surgical History:   Procedure Laterality Date     ARTHROSCOPY KNEE RT/LT      Lt.     CORONARY ARTERY BYPASS  2013    3 vessel-Dr. Bryan Cannon     HERNIA REPAIR, INGUINAL RT/LT      bilateral, repeat Lt. side     ZZC NONSPECIFIC PROCEDURE      finger fracture      No Known Allergies   Social History     Tobacco Use     Smoking status: Never     Smokeless tobacco: Never   Substance Use Topics     Alcohol use: Yes     Comment: occasional      Wt Readings from Last 1 Encounters:   24 70.1 kg (154 lb 8.7 oz)        Anesthesia Evaluation   Pt has had prior anesthetic. Type: General and MAC.    No history of anesthetic complications       ROS/MED HX  ENT/Pulmonary:  - neg pulmonary ROS     Neurologic:  - neg neurologic ROS     Cardiovascular: Comment: Current Cardiac Status perop clinical evaluation   Good exertional capacity ( > 4 mets )  EF 60% (most recent echo )       (+)  - -  CAD -  CABG- -                                      METS/Exercise Tolerance:     Hematologic:  - neg hematologic  ROS     Musculoskeletal:  - neg musculoskeletal ROS     GI/Hepatic:  - neg GI/hepatic ROS     Renal/Genitourinary:  - neg Renal ROS     Endo:  - neg endo ROS     Psychiatric/Substance Use:  - neg psychiatric ROS     Infectious Disease:  - neg infectious disease ROS     Malignancy:  - neg  "malignancy ROS     Other:  - neg other ROS          Physical Exam    Airway        Mallampati: II   TM distance: > 3 FB   Neck ROM: full   Mouth opening: > 3 cm    Respiratory Devices and Support         Dental       (+) Minor Abnormalities - some fillings, tiny chips      Cardiovascular             Pulmonary               OUTSIDE LABS:  CBC:   Lab Results   Component Value Date    WBC 5.7 04/09/2013    WBC 6.3 08/16/2012    HGB 14.4 04/09/2013    HGB 13.5 08/16/2012    HCT 42.5 04/09/2013    HCT 40.3 08/16/2012     04/09/2013     08/16/2012     BMP:   Lab Results   Component Value Date     04/09/2013    POTASSIUM 5.2 04/09/2013    CHLORIDE 100 04/09/2013    CO2 28 04/09/2013    BUN 14 04/09/2013    CR 1.05 04/09/2013    GLC 93 04/09/2013     COAGS: No results found for: \"PTT\", \"INR\", \"FIBR\"  POC: No results found for: \"BGM\", \"HCG\", \"HCGS\"  HEPATIC:   Lab Results   Component Value Date    ALBUMIN 4.0 04/09/2013    PROTTOTAL 7.1 04/09/2013    ALT 25 04/09/2013    AST 23 04/09/2013    ALKPHOS 69 04/09/2013    BILITOTAL 0.5 04/09/2013     OTHER:   Lab Results   Component Value Date    GAVIN 9.2 04/09/2013       Anesthesia Plan    ASA Status:  2    NPO Status:  NPO Appropriate    Anesthesia Type: General.     - Airway: LMA   Induction: Propofol, Intravenous.   Maintenance: TIVA.        Consents    Anesthesia Plan(s) and associated risks, benefits, and realistic alternatives discussed. Questions answered and patient/representative(s) expressed understanding.     - Discussed: Risks, Benefits and Alternatives for BOTH SEDATION and the PROCEDURE were discussed     - Discussed with:  Patient      - Extended Intubation/Ventilatory Support Discussed: No.      - Patient is DNR/DNI Status: No     Use of blood products discussed: No .     Postoperative Care    Pain management: IV analgesics.   PONV prophylaxis: Ondansetron (or other 5HT-3), Background Propofol Infusion     Comments:    Other Comments: The risks " and benefits of anesthesia, and the alternatives where applicable, have been discussed with the patient, and they wish to proceed.            MORENA Caldera CRNA    I have reviewed the pertinent notes and labs in the chart from the past 30 days and (re)examined the patient.  Any updates or changes from those notes are reflected in this note.

## 2025-01-06 NOTE — ANESTHESIA PROCEDURE NOTES
Airway       Patient location during procedure: OR  Staff -        CRNA: Amol Rodarte APRN CRNA       Other Anesthesia Staff: Cassidy Teran       Performed By: CRNA and SRNA  Consent for Airway        Urgency: elective  Indications and Patient Condition       Indications for airway management: ana luisa-procedural       Induction type:intravenous       Mask difficulty assessment: 1 - vent by mask    Final Airway Details       Final airway type: supraglottic airway    Supraglottic Airway Details        Type: LMA       Brand: LMA Unique       LMA size: 4    Post intubation assessment        Placement verified by: capnometry, equal breath sounds and chest rise        Number of attempts at approach: 1       Number of other approaches attempted: 0       Ease of procedure: easy       Dentition: Intact and Unchanged

## 2025-01-06 NOTE — DISCHARGE INSTRUCTIONS
[facility]   Adult Discharge Orders & Instructions     For 24 hours after surgery    Get plenty of rest.  A responsible adult must stay with you for at least 24 hours after you leave the hospital.   Do not drive or use heavy equipment.  If you have weakness or tingling, don't drive or use heavy equipment until this feeling goes away.  Do not drink alcohol.  Avoid strenuous or risky activities.  Ask for help when climbing stairs.   You may feel lightheaded.  If so, sit for a few minutes before standing.  Have someone help you get up.   You may have a slight fever. Call the doctor if your fever is over 100 F (37.7 C) (taken under the tongue) or lasts longer than 24 hours.  You may have a dry mouth, a sore throat, muscle aches or trouble sleeping.  These should go away after 24 hours.  Do not make important or legal decisions.  We don t expect you to have any problems from the surgery or treatment you had today. Just in case, here s what to do if you have pain, upset stomach (nausea), bleeding or infection:  Pain:  Take medicines your doctor has prescribed or over-the-counter medicine they have suggested. Resting and using ice packs can help, too. For surgery on an arm or leg, raise it on a pillow to ease swelling. Call your doctor if these methods don t work.  Copyright Adithya King, Licensed under [employer]  Upset stomach (nausea):  Take anti-nausea medicine approved by your doctor. Drink clear liquids like apple juice, ginger ale, broth or 7-Up. Be sure to drink enough fluids. Rest can help, too. Move to normal foods when you re ready.   Bleeding:  In the first 24 hours, you may see a little blood on your dressing, about the size of a quarter. You don t need to worry about this much blood, but if the blood spot keeps getting bigger:  Put pressure on the wound if you can, AND  Call your doctor.  Copyright [employer], Licensed under [employer]  Fever/Infection: Please call  your doctor if you have any of these signs:  Redness  Swelling  Wound feels warm  Pain gets worse  Bad-smelling fluid leaks from wound  Fever or chills  Call your doctor for any of the followin.  It has been over 8 to 10 hours since surgery and you are still not able to urinate (pass water).    2.  Headache for over 24 hours.    3.  Numbness, tingling or weakness in your legs the day after surgery (if you had spinal anesthesia).    For patient questions :  24 Hour Nurse Advisor Line: [phone]  Urology: [phone]

## 2025-01-06 NOTE — ANESTHESIA POSTPROCEDURE EVALUATION
Patient: Fernando Hurt    Procedure: Procedure(s):  TRANSURETHRAL RESECTION (TUR) PROSTATE, USING KTP LASER       Anesthesia Type:  General    Note:  Disposition: Outpatient   Postop Pain Control: Uneventful            Sign Out: Well controlled pain   PONV: No   Neuro/Psych: Uneventful            Sign Out: Acceptable/Baseline neuro status   Airway/Respiratory: Uneventful            Sign Out: Acceptable/Baseline resp. status   CV/Hemodynamics: Uneventful            Sign Out: Acceptable CV status; No obvious hypovolemia; No obvious fluid overload   Other NRE: NONE   DID A NON-ROUTINE EVENT OCCUR? No           Last vitals:  Vitals Value Taken Time   /64 01/06/25 1410   Temp 96.8  F (36  C) 01/06/25 1411   Pulse 74 01/06/25 1411   Resp 16 01/06/25 1411   SpO2 97 % 01/06/25 1411   Vitals shown include unfiled device data.    Electronically Signed By: MORENA High CRNA  January 6, 2025  2:52 PM

## 2025-02-04 ENCOUNTER — OFFICE VISIT (OUTPATIENT)
Dept: UROLOGY | Facility: CLINIC | Age: OVER 89
End: 2025-02-04
Payer: MEDICARE

## 2025-02-04 VITALS
SYSTOLIC BLOOD PRESSURE: 162 MMHG | WEIGHT: 155 LBS | DIASTOLIC BLOOD PRESSURE: 80 MMHG | HEART RATE: 64 BPM | OXYGEN SATURATION: 98 %

## 2025-02-04 DIAGNOSIS — N40.1 BENIGN PROSTATIC HYPERPLASIA WITH URINARY RETENTION: Primary | ICD-10-CM

## 2025-02-04 DIAGNOSIS — R33.8 BENIGN PROSTATIC HYPERPLASIA WITH URINARY RETENTION: Primary | ICD-10-CM

## 2025-02-04 DIAGNOSIS — I10 HYPERTENSION, UNSPECIFIED TYPE: ICD-10-CM

## 2025-02-04 PROCEDURE — 51798 US URINE CAPACITY MEASURE: CPT | Performed by: UROLOGY

## 2025-02-04 PROCEDURE — 99024 POSTOP FOLLOW-UP VISIT: CPT | Performed by: UROLOGY

## 2025-02-04 NOTE — PROGRESS NOTES
Chief Complaint   Patient presents with    RECHECK       Fernando Hurt is a 89 year old male who presents today for follow up of   Chief Complaint   Patient presents with    RECHECK    Follow up post TURP for retention of urine.  He is doing well since surgery without any complaints.    Current Outpatient Medications   Medication Sig Dispense Refill    amLODIPine (NORVASC) 5 MG tablet       atorvastatin (LIPITOR) 40 MG tablet Take 20 mg by mouth daily       dorzolamide (TRUSOPT) 2 % ophthalmic solution Place 1 drop into both eyes 2 times daily.      metoprolol (TOPROL XL) 100 MG 24 hr tablet Take 100 mg by mouth daily.      Multiple Vitamins-Minerals (PRESERVISION/LUTEIN PO) Take  by mouth.      NATURAL PSYLLIUM SEED PO Take  by mouth 3 times daily. 1 tsp      nitroglycerin (NITROSTAT) 0.4 MG SL tablet Place  under the tongue every 5 minutes as needed.      timolol maleate (TIMOPTIC) 0.5 % ophthalmic solution Place 1 drop into both eyes daily.      travoprost BAK FREE (TRAVATAN Z) 0.004 % ophthalmic solution Apply 1 drop to eye       No Known Allergies   Past Medical History:   Diagnosis Date    Actinic keratoses     Actinic keratosis     CAD (coronary artery disease) 2013    3 vessel bypass 4/13    Glaucoma     Hyperlipidemia LDL goal < 70     Renal cyst     Rt. large    Renal stones     Skin cancer 2/16/2009    RIGHT SHOULDER,KERATOACANTHOMA    Squamous cell carcinoma 02/10    Rt  forearm    Unstable angina (H) 2013    [facility] consult 4/2013  Dr. Ojeda     Past Surgical History:   Procedure Laterality Date    ARTHROSCOPY KNEE RT/LT      Lt.    CORONARY ARTERY BYPASS  4/19/2013    3 vessel-Dr. Bryan Cannon    HERNIA REPAIR, INGUINAL RT/LT      bilateral, repeat Lt. side    LASER KTP TRANSURETHRAL RESECTION (TUR) PROSTATE N/A 1/6/2025    Procedure: TRANSURETHRAL RESECTION PROSTATE, USING KTP LASER;  Surgeon: Yeison Perrin MD;  Location:  OR    [facility] NONSPECIFIC PROCEDURE      finger fracture      Family History   Problem Relation Age of Onset    Cancer Mother      Social History     Socioeconomic History    Marital status:      Spouse name: None    Number of children: None    Years of education: None    Highest education level: None   Occupational History     Employer: RETIRED   Tobacco Use    Smoking status: Never    Smokeless tobacco: Never   Substance and Sexual Activity    Alcohol use: Yes     Comment: occasional    Drug use: No    Sexual activity: Not Currently     Social Drivers of Health     Financial Resource Strain: Low Risk  (2/8/2024)    Received from [facility]    Financial Resource Strain     Difficulty of Paying Living Expenses: 3   Food Insecurity: No Food Insecurity (2/8/2024)    Received from [facility]    Food Insecurity     Do you worry your food will run out before you are able to buy more?: 1   Transportation Needs: No Transportation Needs (2/8/2024)    Received from [facility]    Transportation Needs     Does lack of transportation keep you from medical appointments?: 1     Does lack of transportation keep you from work, meetings or getting things that you need?: 1   Social Connections: Socially Integrated (2/8/2024)    Received from [facility]    Social Connections     Do you often feel lonely or isolated from those around you?: 0   Interpersonal Safety: Low Risk  (1/6/2025)    Interpersonal Safety     Do you feel physically and emotionally safe where you currently live?: Yes     Within the past 12 months, have you been hit, slapped, kicked or otherwise physically hurt by someone?: No     Within the past 12 months, have you been humiliated or emotionally abused in other ways by your partner or ex-partner?: No   Housing Stability: Low Risk  (2/8/2024)    Received from [facility]    Housing Stability     What  is your housing situation today?: 1       REVIEW OF SYSTEMS  =================  C: NEGATIVE for fever, chills, change in weight  I: NEGATIVE for worrisome rashes, moles or lesions  E/M: NEGATIVE for ear, mouth and throat problems  R: NEGATIVE for significant cough or SHORTNESS OF BREATH  CV:  NEGATIVE for chest pain, palpitations or peripheral edema  GI: NEGATIVE for nausea, abdominal pain, heartburn, or change in bowel habits  NEURO: NEGATIVE numbness/weakness  : see HPI  PSYCH: NEGATIVE depression/anxiety  LYmph: no new enlarged lymph nodes  Ortho: no new trauma/movements    Physical Exam:  Blood pressure (!) 162/80, pulse 64, weight 70.3 kg (155 lb), SpO2 98%.    GENERAL: healthy, alert and no distress  EYES: Eyes grossly normal to inspection, conjunctivae and sclerae normal  RESP: no audible wheeze, cough, or visible cyanosis.  No visible retractions or increased work of breathing.  Able to speak fully in complete sentences.  NEURO: Cranial nerves grossly intact, mentation intact and speech normal  PSYCH: mentation appears normal, affect normal/bright, judgement and insight intact, normal speech and appearance well-groomed    Assessment/Plan:   (N40.1,  R33.8) Benign prostatic hyperplasia with urinary retention  (primary encounter diagnosis)  Comment: PVR 80 ml from over 400 ml prior to surgery  Plan: doing well post op          Rtc prn    HTN: Bill to follow up with Primary Care provider regarding elevated blood pressure.           Please note: Voice recognition software was used in this dictation.  It may therefore contain typographical errors.

## (undated) DEVICE — Device

## (undated) DEVICE — TUBING SUCTION 12"X1/4" N612

## (undated) DEVICE — SYR 30ML LL W/O NDL

## (undated) DEVICE — GLOVE BIOGEL PI ULTRATOUCH G SZ 7.0 42170

## (undated) DEVICE — BAG URINARY DRAIN 4000ML LF 153509

## (undated) DEVICE — SOL NACL 0.9% INJ 1000ML BAG 07983-09

## (undated) DEVICE — TUBING SOLUTION SET CONTINU-FLO CLEARLINK 110" 2C8537

## (undated) DEVICE — GOWN XLG DISP 9545

## (undated) DEVICE — CATH FOLEY 3WAY 22FR 30ML LUBRICATH LATEX 0167L22

## (undated) DEVICE — SUCTION MANIFOLD NEPTUNE 2 SYS 4 PORT 0702-020-000

## (undated) DEVICE — FORTEC XPS LASER

## (undated) DEVICE — SOL NACL 0.9% IRRIG 3000ML BAG 2B7477

## (undated) RX ORDER — FENTANYL CITRATE 50 UG/ML
INJECTION, SOLUTION INTRAMUSCULAR; INTRAVENOUS
Status: DISPENSED
Start: 2025-01-06

## (undated) RX ORDER — EPHEDRINE SULFATE 50 MG/ML
INJECTION, SOLUTION INTRAMUSCULAR; INTRAVENOUS; SUBCUTANEOUS
Status: DISPENSED
Start: 2025-01-06